# Patient Record
Sex: FEMALE | Race: WHITE | NOT HISPANIC OR LATINO | Employment: OTHER | URBAN - METROPOLITAN AREA
[De-identification: names, ages, dates, MRNs, and addresses within clinical notes are randomized per-mention and may not be internally consistent; named-entity substitution may affect disease eponyms.]

---

## 2021-07-22 ENCOUNTER — APPOINTMENT (OUTPATIENT)
Dept: CT IMAGING | Facility: HOSPITAL | Age: 31
End: 2021-07-22
Attending: STUDENT IN AN ORGANIZED HEALTH CARE EDUCATION/TRAINING PROGRAM
Payer: MEDICAID

## 2021-07-22 ENCOUNTER — HOSPITAL ENCOUNTER (INPATIENT)
Facility: HOSPITAL | Age: 31
LOS: 1 days | Discharge: HOME | End: 2021-07-23
Attending: STUDENT IN AN ORGANIZED HEALTH CARE EDUCATION/TRAINING PROGRAM | Admitting: STUDENT IN AN ORGANIZED HEALTH CARE EDUCATION/TRAINING PROGRAM
Payer: MEDICAID

## 2021-07-22 DIAGNOSIS — F10.939 ALCOHOL WITHDRAWAL SYNDROME WITH COMPLICATION (H): ICD-10-CM

## 2021-07-22 LAB
ALBUMIN SERPL-MCNC: 4.3 G/DL (ref 3.5–5)
ALBUMIN UR-MCNC: 10 MG/DL
ALP SERPL-CCNC: 79 U/L (ref 45–120)
ALT SERPL W P-5'-P-CCNC: 79 U/L (ref 0–45)
AMPHETAMINES UR QL SCN: NORMAL
ANION GAP SERPL CALCULATED.3IONS-SCNC: 15 MMOL/L (ref 5–18)
APPEARANCE UR: CLEAR
AST SERPL W P-5'-P-CCNC: 97 U/L (ref 0–40)
BACTERIA #/AREA URNS HPF: ABNORMAL /HPF
BARBITURATES UR QL: NORMAL
BENZODIAZ UR QL: NORMAL
BILIRUB DIRECT SERPL-MCNC: 0.3 MG/DL
BILIRUB SERPL-MCNC: 1.1 MG/DL (ref 0–1)
BILIRUB UR QL STRIP: NEGATIVE
BUN SERPL-MCNC: 8 MG/DL (ref 8–22)
CALCIUM SERPL-MCNC: 9 MG/DL (ref 8.5–10.5)
CANNABINOIDS UR QL SCN: NORMAL
CHLORIDE BLD-SCNC: 101 MMOL/L (ref 98–107)
CO2 SERPL-SCNC: 21 MMOL/L (ref 22–31)
COCAINE UR QL: NORMAL
COLOR UR AUTO: ABNORMAL
CREAT SERPL-MCNC: 0.63 MG/DL (ref 0.6–1.1)
CREAT UR-MCNC: 63 MG/DL
ERYTHROCYTE [DISTWIDTH] IN BLOOD BY AUTOMATED COUNT: 13.1 % (ref 10–15)
ETHANOL SERPL-MCNC: <10 MG/DL
GFR SERPL CREATININE-BSD FRML MDRD: >90 ML/MIN/1.73M2
GLUCOSE BLD-MCNC: 118 MG/DL (ref 70–125)
GLUCOSE UR STRIP-MCNC: NEGATIVE MG/DL
HCG SERPL QL: NEGATIVE
HCT VFR BLD AUTO: 38.4 % (ref 35–47)
HGB BLD-MCNC: 12.9 G/DL (ref 11.7–15.7)
HGB UR QL STRIP: ABNORMAL
HOLD SPECIMEN: NORMAL
KETONES UR STRIP-MCNC: 100 MG/DL
LEUKOCYTE ESTERASE UR QL STRIP: NEGATIVE
MAGNESIUM SERPL-MCNC: 1.5 MG/DL (ref 1.8–2.6)
MCH RBC QN AUTO: 32.6 PG (ref 26.5–33)
MCHC RBC AUTO-ENTMCNC: 33.6 G/DL (ref 31.5–36.5)
MCV RBC AUTO: 97 FL (ref 78–100)
METHADONE UR QL SCN: NORMAL
MUCOUS THREADS #/AREA URNS LPF: PRESENT /LPF
NITRATE UR QL: NEGATIVE
OPIATES UR QL SCN: NORMAL
OXYCODONE UR QL: NORMAL
PCP UR QL SCN: NORMAL
PH UR STRIP: 5.5 [PH] (ref 5–7)
PHOSPHATE SERPL-MCNC: 2 MG/DL (ref 2.5–4.5)
PLATELET # BLD AUTO: 205 10E3/UL (ref 150–450)
POTASSIUM BLD-SCNC: 3.5 MMOL/L (ref 3.5–5)
PROT SERPL-MCNC: 7.5 G/DL (ref 6–8)
RBC # BLD AUTO: 3.96 10E6/UL (ref 3.8–5.2)
RBC URINE: <1 /HPF
SARS-COV-2 RNA RESP QL NAA+PROBE: NEGATIVE
SODIUM SERPL-SCNC: 137 MMOL/L (ref 136–145)
SP GR UR STRIP: 1.01 (ref 1–1.03)
SQUAMOUS EPITHELIAL: 1 /HPF
UROBILINOGEN UR STRIP-MCNC: <2 MG/DL
WBC # BLD AUTO: 8.3 10E3/UL (ref 4–11)
WBC URINE: 1 /HPF

## 2021-07-22 PROCEDURE — 80307 DRUG TEST PRSMV CHEM ANLYZR: CPT | Performed by: MASSAGE THERAPIST

## 2021-07-22 PROCEDURE — 250N000013 HC RX MED GY IP 250 OP 250 PS 637: Performed by: FAMILY MEDICINE

## 2021-07-22 PROCEDURE — 250N000013 HC RX MED GY IP 250 OP 250 PS 637: Performed by: STUDENT IN AN ORGANIZED HEALTH CARE EDUCATION/TRAINING PROGRAM

## 2021-07-22 PROCEDURE — 250N000011 HC RX IP 250 OP 636: Performed by: STUDENT IN AN ORGANIZED HEALTH CARE EDUCATION/TRAINING PROGRAM

## 2021-07-22 PROCEDURE — 93005 ELECTROCARDIOGRAM TRACING: CPT | Performed by: STUDENT IN AN ORGANIZED HEALTH CARE EDUCATION/TRAINING PROGRAM

## 2021-07-22 PROCEDURE — 120N000001 HC R&B MED SURG/OB

## 2021-07-22 PROCEDURE — 36415 COLL VENOUS BLD VENIPUNCTURE: CPT | Performed by: STUDENT IN AN ORGANIZED HEALTH CARE EDUCATION/TRAINING PROGRAM

## 2021-07-22 PROCEDURE — 99285 EMERGENCY DEPT VISIT HI MDM: CPT | Mod: 25

## 2021-07-22 PROCEDURE — 250N000011 HC RX IP 250 OP 636: Performed by: MASSAGE THERAPIST

## 2021-07-22 PROCEDURE — C9803 HOPD COVID-19 SPEC COLLECT: HCPCS

## 2021-07-22 PROCEDURE — 82248 BILIRUBIN DIRECT: CPT | Performed by: MASSAGE THERAPIST

## 2021-07-22 PROCEDURE — 96365 THER/PROPH/DIAG IV INF INIT: CPT

## 2021-07-22 PROCEDURE — 250N000009 HC RX 250: Performed by: STUDENT IN AN ORGANIZED HEALTH CARE EDUCATION/TRAINING PROGRAM

## 2021-07-22 PROCEDURE — HZ2ZZZZ DETOXIFICATION SERVICES FOR SUBSTANCE ABUSE TREATMENT: ICD-10-PCS | Performed by: STUDENT IN AN ORGANIZED HEALTH CARE EDUCATION/TRAINING PROGRAM

## 2021-07-22 PROCEDURE — 70450 CT HEAD/BRAIN W/O DYE: CPT

## 2021-07-22 PROCEDURE — 84703 CHORIONIC GONADOTROPIN ASSAY: CPT | Performed by: STUDENT IN AN ORGANIZED HEALTH CARE EDUCATION/TRAINING PROGRAM

## 2021-07-22 PROCEDURE — 87635 SARS-COV-2 COVID-19 AMP PRB: CPT | Performed by: EMERGENCY MEDICINE

## 2021-07-22 PROCEDURE — 83735 ASSAY OF MAGNESIUM: CPT | Performed by: MASSAGE THERAPIST

## 2021-07-22 PROCEDURE — 96375 TX/PRO/DX INJ NEW DRUG ADDON: CPT

## 2021-07-22 PROCEDURE — 96366 THER/PROPH/DIAG IV INF ADDON: CPT

## 2021-07-22 PROCEDURE — 85027 COMPLETE CBC AUTOMATED: CPT | Performed by: STUDENT IN AN ORGANIZED HEALTH CARE EDUCATION/TRAINING PROGRAM

## 2021-07-22 PROCEDURE — 81001 URINALYSIS AUTO W/SCOPE: CPT | Performed by: MASSAGE THERAPIST

## 2021-07-22 PROCEDURE — 82077 ASSAY SPEC XCP UR&BREATH IA: CPT | Performed by: STUDENT IN AN ORGANIZED HEALTH CARE EDUCATION/TRAINING PROGRAM

## 2021-07-22 PROCEDURE — 96376 TX/PRO/DX INJ SAME DRUG ADON: CPT

## 2021-07-22 PROCEDURE — 84100 ASSAY OF PHOSPHORUS: CPT | Performed by: STUDENT IN AN ORGANIZED HEALTH CARE EDUCATION/TRAINING PROGRAM

## 2021-07-22 PROCEDURE — 80053 COMPREHEN METABOLIC PANEL: CPT | Performed by: STUDENT IN AN ORGANIZED HEALTH CARE EDUCATION/TRAINING PROGRAM

## 2021-07-22 PROCEDURE — 258N000003 HC RX IP 258 OP 636: Performed by: STUDENT IN AN ORGANIZED HEALTH CARE EDUCATION/TRAINING PROGRAM

## 2021-07-22 RX ORDER — GABAPENTIN 300 MG/1
300 CAPSULE ORAL EVERY 8 HOURS
Status: DISCONTINUED | OUTPATIENT
Start: 2021-07-28 | End: 2021-07-23 | Stop reason: HOSPADM

## 2021-07-22 RX ORDER — ONDANSETRON 2 MG/ML
4 INJECTION INTRAMUSCULAR; INTRAVENOUS EVERY 6 HOURS PRN
Status: DISCONTINUED | OUTPATIENT
Start: 2021-07-22 | End: 2021-07-23 | Stop reason: HOSPADM

## 2021-07-22 RX ORDER — ONDANSETRON 4 MG/1
4 TABLET, ORALLY DISINTEGRATING ORAL EVERY 6 HOURS PRN
Status: DISCONTINUED | OUTPATIENT
Start: 2021-07-22 | End: 2021-07-23 | Stop reason: HOSPADM

## 2021-07-22 RX ORDER — GABAPENTIN 100 MG/1
100 CAPSULE ORAL EVERY 8 HOURS
Status: DISCONTINUED | OUTPATIENT
Start: 2021-07-30 | End: 2021-07-23 | Stop reason: HOSPADM

## 2021-07-22 RX ORDER — CLONIDINE HYDROCHLORIDE 0.1 MG/1
0.1 TABLET ORAL EVERY 8 HOURS
Status: DISCONTINUED | OUTPATIENT
Start: 2021-07-22 | End: 2021-07-23 | Stop reason: HOSPADM

## 2021-07-22 RX ORDER — LIDOCAINE 40 MG/G
CREAM TOPICAL
Status: DISCONTINUED | OUTPATIENT
Start: 2021-07-22 | End: 2021-07-23 | Stop reason: HOSPADM

## 2021-07-22 RX ORDER — GABAPENTIN 600 MG/1
1200 TABLET ORAL ONCE
Status: DISCONTINUED | OUTPATIENT
Start: 2021-07-22 | End: 2021-07-22

## 2021-07-22 RX ORDER — LORAZEPAM 0.5 MG/1
1-2 TABLET ORAL EVERY 30 MIN PRN
Status: DISCONTINUED | OUTPATIENT
Start: 2021-07-22 | End: 2021-07-23 | Stop reason: HOSPADM

## 2021-07-22 RX ORDER — GABAPENTIN 400 MG/1
1200 CAPSULE ORAL ONCE
Status: COMPLETED | OUTPATIENT
Start: 2021-07-22 | End: 2021-07-22

## 2021-07-22 RX ORDER — FLUMAZENIL 0.1 MG/ML
0.2 INJECTION, SOLUTION INTRAVENOUS
Status: DISCONTINUED | OUTPATIENT
Start: 2021-07-22 | End: 2021-07-23 | Stop reason: HOSPADM

## 2021-07-22 RX ORDER — GABAPENTIN 300 MG/1
900 CAPSULE ORAL EVERY 8 HOURS
Status: DISCONTINUED | OUTPATIENT
Start: 2021-07-23 | End: 2021-07-23 | Stop reason: HOSPADM

## 2021-07-22 RX ORDER — MAGNESIUM SULFATE HEPTAHYDRATE 40 MG/ML
2 INJECTION, SOLUTION INTRAVENOUS ONCE
Status: COMPLETED | OUTPATIENT
Start: 2021-07-22 | End: 2021-07-22

## 2021-07-22 RX ORDER — MULTIPLE VITAMINS W/ MINERALS TAB 9MG-400MCG
1 TAB ORAL DAILY
Status: DISCONTINUED | OUTPATIENT
Start: 2021-07-23 | End: 2021-07-23 | Stop reason: HOSPADM

## 2021-07-22 RX ORDER — GABAPENTIN 300 MG/1
600 CAPSULE ORAL EVERY 8 HOURS
Status: DISCONTINUED | OUTPATIENT
Start: 2021-07-26 | End: 2021-07-23 | Stop reason: HOSPADM

## 2021-07-22 RX ORDER — LORAZEPAM 2 MG/ML
1 INJECTION INTRAMUSCULAR ONCE
Status: COMPLETED | OUTPATIENT
Start: 2021-07-22 | End: 2021-07-22

## 2021-07-22 RX ORDER — OLANZAPINE 5 MG/1
5-10 TABLET, ORALLY DISINTEGRATING ORAL EVERY 6 HOURS PRN
Status: DISCONTINUED | OUTPATIENT
Start: 2021-07-22 | End: 2021-07-23 | Stop reason: HOSPADM

## 2021-07-22 RX ORDER — LORAZEPAM 2 MG/ML
1-2 INJECTION INTRAMUSCULAR EVERY 30 MIN PRN
Status: DISCONTINUED | OUTPATIENT
Start: 2021-07-22 | End: 2021-07-23 | Stop reason: HOSPADM

## 2021-07-22 RX ORDER — HALOPERIDOL 5 MG/ML
2.5-5 INJECTION INTRAMUSCULAR EVERY 6 HOURS PRN
Status: DISCONTINUED | OUTPATIENT
Start: 2021-07-22 | End: 2021-07-23 | Stop reason: HOSPADM

## 2021-07-22 RX ORDER — FOLIC ACID 1 MG/1
1 TABLET ORAL DAILY
Status: DISCONTINUED | OUTPATIENT
Start: 2021-07-23 | End: 2021-07-23 | Stop reason: HOSPADM

## 2021-07-22 RX ADMIN — LORAZEPAM 1 MG: 2 INJECTION INTRAMUSCULAR; INTRAVENOUS at 16:48

## 2021-07-22 RX ADMIN — GABAPENTIN 1200 MG: 400 CAPSULE ORAL at 21:12

## 2021-07-22 RX ADMIN — CLONIDINE HYDROCHLORIDE 0.1 MG: 0.1 TABLET ORAL at 19:26

## 2021-07-22 RX ADMIN — FOLIC ACID: 5 INJECTION, SOLUTION INTRAMUSCULAR; INTRAVENOUS; SUBCUTANEOUS at 21:13

## 2021-07-22 RX ADMIN — LORAZEPAM 1 MG: 2 INJECTION INTRAMUSCULAR; INTRAVENOUS at 14:39

## 2021-07-22 RX ADMIN — MAGNESIUM SULFATE HEPTAHYDRATE 2 G: 40 INJECTION, SOLUTION INTRAVENOUS at 15:25

## 2021-07-22 ASSESSMENT — ACTIVITIES OF DAILY LIVING (ADL)
DIFFICULTY_COMMUNICATING: NO
CONCENTRATING,_REMEMBERING_OR_MAKING_DECISIONS_DIFFICULTY: NO
DIFFICULTY_EATING/SWALLOWING: NO
FALL_HISTORY_WITHIN_LAST_SIX_MONTHS: YES
HEARING_DIFFICULTY_OR_DEAF: NO
DOING_ERRANDS_INDEPENDENTLY_DIFFICULTY: NO
NUMBER_OF_TIMES_PATIENT_HAS_FALLEN_WITHIN_LAST_SIX_MONTHS: 1
DRESSING/BATHING_DIFFICULTY: NO
WHICH_OF_THE_ABOVE_FUNCTIONAL_RISKS_HAD_A_RECENT_ONSET_OR_CHANGE?: FALL HISTORY
VISION_MANAGEMENT: CONTACTS
WEAR_GLASSES_OR_BLIND: YES
WALKING_OR_CLIMBING_STAIRS_DIFFICULTY: NO
DEPENDENT_IADLS:: INDEPENDENT
TOILETING_ISSUES: NO

## 2021-07-22 ASSESSMENT — MIFFLIN-ST. JEOR: SCORE: 1446.14

## 2021-07-22 NOTE — ED NOTES
Bed: JNED-22  Expected date: 7/22/21  Expected time:   Means of arrival: Ambulance  Comments:  31 F  Andria  seizure

## 2021-07-22 NOTE — H&P
[facility]    History and Physical - [facility]        Date of Admission:  7/22/2021    Assessment & Plan      Susan Hope is a 31 year old female admitted on 7/22/2021. She has a history of alcohol use disorder and is admitted for management of alcohol withdrawal and seizure.    Remains admitted while managing alcohol withdrawal.      #Alcohol withdrawal  #Withdrawal seizure  #Head trauma  Patient with extensive history of alcohol abuse presents for withdrawal symptoms and what sounds like a withdrawal seizure.   Patient did bite her tongue but there was no incontinence.  U tox was otherwise negative and patient denies any other illicit substance use.  Last drink was sometime this morning.  She does have a history of significant tremors with withdrawals but has never had a seizure.  Lab work on arrival showed mildly elevated ALT/AST and bilirubin.  Patient is a little acidotic/dehydrated with low CO2 and ketones in her urine.  Patient did well after some ativan in the ED.  CT of the head did not show any acute injury at this time.  Patient has essentially returned to her baseline at this time.  She is unsure if she would like a rule 25 consult at this time or not, but will think about it.  She understands the plan to admit for CIWA support at this time and was informed it can sometimes take up to 72 hours before getting through withdrawal, it will depend on how her body handles it.    -CIWA protocol  -IV thiamine and folate, will transition to oral per protocol  -Gabapentin per protocol  -Clonidine per protocol  -Ativan as needed for withdrawal symptoms  -INR check in AM with   -Repeat labs in AM  -Patient will decide on rule 25 referral  -Could consider neurology referral, however history consistent with withdrawal seizures     #Hypomagnesemia  Patients magnesium was low on admission  -Start magnesium replacement protocol.       Diet: Combination Diet Regular Diet Adult     DVT Prophylaxis: Pneumatic Compression Devices  Wilhelm Catheter: Not present  Fluids: N/A  Central Lines: None  Code Status: Full Code      Risk Factors Present on Admission           # Hypomagnesemia: Mg = 1.5 mg/dL (Ref range: 1.8 - 2.6 mg/dL) on admission, will replace as needed         Disposition Plan   Expected discharge: Unclear at this time, recommended to prior living arrangement once safe disposition plan/ TCU bed available.     The patient's care was discussed with the faculty at morning report.    Tonio Pascual MD  [facility]  Securely message with the Vocera Web Console (learn more here)  Text page via [facility] Paging/Directory    ______________________________________________________________________    Chief Complaint   Seizure, possibly due to alcohol withdrawl    History of Present Illness   Susan Hope is a 31 year old female admitted on 7/22/2021. She has a history of alcohol use disorder and is admitted for management of alcohol withdrawal and seizure.  Patient has a history of alcohol abuse and had been drinking recently.  While she has never been to inpatient or outpatient treatment, she had been able to wean herself off of alcohol and stay sober several months at a time a couple of times in the past.  Unfortunately with the pandemic she returned to drinking.  While she had worked up to several beverages made with hard liquor everyday, she had recently been weaning down and is currently on one bottle of wine a day.  When she has tried to stop drinking recently she would get terrible chills and tremors and return to drinking.  Her last drink of alcohol was this morning.  This afternoon the patient became lightheaded walking up stairs.  She proceeded to fall and had several minutes of full body shaking per her partner who witnessed the event.  EMS were contacted and arrived on scene to note a postictal period.  While the patient did not  report any incontinence of bolwel or bladder she did bite her tongue.  She has since returned to baseline.  Currently she is feeling stable and denies any acute questions or concerns.      Review of Systems    The 10 point Review of Systems is negative other than noted in the HPI or here.     Past Medical History    Alcohol abuse     Past Surgical History   Patient denies any acute surgical history    Social History   Lives with partner.  Self employed as a .  Drinks alcohol.  Denies any illicit substance use.      Family History   No significant family history,    Prior to Admission Medications   Prior to Admission Medications   Prescriptions Last Dose Informant Patient Reported? Taking?   sertraline (ZOLOFT) 50 MG tablet 7/22/2021 at am  Yes Yes   Sig: Take 75 mg by mouth daily      Facility-Administered Medications: None     Allergies   No Known Allergies    Physical Exam   Vital Signs: Temp: 98.6  F (37  C) Temp src: Axillary BP: 124/74 Pulse: 115   Resp: 20 SpO2: 95 % O2 Device: None (Room air)    Weight: 159 lbs 11.2 oz    Constitutional: awake, alert, cooperative, no apparent distress, and appears stated age, sitting up and watching television  Eyes: Lids and lashes normal, pupils ~4 cm dilated equal, round and reactive to light, extra ocular muscles intact, sclera clear, conjunctiva normal  ENT: Normocephalic, patient does have abrasion noted to her right sided temporal region, not a laceration requiring sutures.  Sinuses nontender on palpation, external ears without lesions, oral pharynx with moist mucous membranes, patient does have ~3 cm abrasion noted to her lateral right tongue, does not appear deep enough to suture, no obvious infection or source of bleeding currently, tonsils without erythema or exudates, gums normal and good dentition.  Hematologic / Lymphatic: no cervical lymphadenopathy  Respiratory: No increased work of breathing, good air exchange, clear to auscultation bilaterally,  no crackles or wheezing  Cardiovascular: Normal apical impulse, regular rate and rhythm, normal S1 and S2, no S3 or S4, and no murmur noted  GI: No scars, normal bowel sounds, soft, non-distended, non-tender, no masses palpated, no hepatosplenomegally  Skin: Other than area on head, no significant rashes or abrasions noted  Musculoskeletal: There is no redness, warmth, or swelling of the joints.  Full range of motion noted.  Motor strength is 5 out of 5 all extremities bilaterally.  Tone is normal.  Neurologic: Awake, alert, oriented to name, place and time.  Cranial nerves II-XII are grossly intact.  Motor is 5 out of 5 bilaterally.  Cerebellar finger to nose, heel to shin intact.  Sensory is intact.  Babinski down going, Romberg negative, and gait is normal.  Neuropsychiatric: General: normal, calm and normal eye contact  Affect: anxious    Data   Data reviewed today: I reviewed all medications, new labs and imaging results over the last 24 hours.   Recent Labs   Lab 07/22/21  1424   WBC 8.3   HGB 12.9   MCV 97         POTASSIUM 3.5   CHLORIDE 101   CO2 21*   BUN 8   CR 0.63   ANIONGAP 15   LYNNETTE 9.0      ALBUMIN 4.3   PROTTOTAL 7.5   BILITOTAL 1.1*   ALKPHOS 79   ALT 79*   AST 97*

## 2021-07-22 NOTE — ED TRIAGE NOTES
Pt arrives via EMS for c/o having a witnessed seizure.  Per report, pt was with her  walking up some stairs when  She started feeling lightheaded.  Pt's spouse assisted patient to the bed and had a full body seizure.  Pt has no known history of seizures.  Pt admits she drinks about a bottle of wine daily.  Pt is tremulous and is anxious appearing.

## 2021-07-22 NOTE — ED PROVIDER NOTES
Emergency Department Encounter     Evaluation Date & Time:   7/22/2021  2:07 PM    CHIEF COMPLAINT:  Seizures and Alcohol Problem      Triage Note:No notes on file      Impression and Plan     ED COURSE & MEDICAL DECISION MAKING:    Susan Hope is a 31 year old female with history of alcohol use disorder here after a seizure.  This does appear to be a true seizure with witnessed, diffuse shaking, bite on lateral side of tongue and postictal period observed by EMS.     This is her first seizure and so a wide differential was considered including:   Metabolic derangement- except for mildly low magnesium, electrolytes WNL   Intracranial process- CT negative, she should follow up with neurology for outpatient MRI  Do not suspect infectious etiology as she has been afebrile, no white count, no infectious symptoms     Think this is most likely alcohol withdrawal seizure, although she reports last drink was less than 24 hours ago, she does report she has been trying to cut down and recently switched from hard alcohol to wine. She has previously had tremors when trying to stop drinking, but reports no previous seizure.      At the conclusion of the encounter I discussed the results of all the tests and the disposition. The questions were answered. The patient or family acknowledged understanding and was agreeable with the care plan.    FINAL IMPRESSION:    ICD-10-CM    1. Alcohol withdrawal syndrome with complication (H)  F10.239          Reassessments & Consults       MEDICATIONS GIVEN IN THE EMERGENCY DEPARTMENT:  Medications   LORazepam (ATIVAN) injection 1 mg (has no administration in time range)   LORazepam (ATIVAN) injection 1 mg (1 mg Intravenous Given 7/22/21 1439)   magnesium sulfate 2 g in water intermittent infusion (2 g Intravenous New Bag 7/22/21 1525)       NEW PRESCRIPTIONS STARTED AT TODAY'S ED VISIT:  New Prescriptions    No medications on file       HPI     HPI     Susan Hope is a 31 year  old female with a pertinent history of alcohol use disorder who presents to this ED for evaluation of seizure. Per EMS: patient got light headed while walking up stairs today. She fell and have several minutes of full body shaking, witnessed by her partner. No reported urinary incontinence but was postictal when medics arrived.     On arrival, patient does not remember the incident and reports no fevers/chills, headache, vision changes, shortness of breath, chest pain or any other pain.  She does have some nausea but no vomiting. She reports that she has been drinking about about 1 bottle of wine per day, she has silvio trying to wean herself off of hard alcohol, last drink 7/21 evening.          REVIEW OF SYSTEMS:  Review of Systems  Non contributory except for as noted in HPi      Medical History     No past medical history on file.    No past surgical history on file.    No family history on file.    Social History     Tobacco Use     Smoking status: Not on file   Substance Use Topics     Alcohol use: Not on file     Drug use: Not on file       sertraline (ZOLOFT) 50 MG tablet        Physical Exam     First Vitals:  Patient Vitals for the past 24 hrs:   BP Temp Temp src Pulse Resp SpO2 Weight   07/22/21 1411 130/83 97.9  F (36.6  C) Oral 102 22 95 % 65.8 kg (145 lb)       PHYSICAL EXAM:   Physical Exam  EXAM  General: Alert, anxious appearing   HEENT: dry oral mucus membranes, abrasion to right side of tongue   Pulm: CTA BL, no tachypnea, speaking in full sentences  CV:Tachycardic, regular, no murmur  Abd: soft, NTND, no masses  Ext: Warm, well perfused, no LE edema  Neuro: tremulous, sensation and strength intact in all 4 extremities, CN II-XII intact, finger-nose-finger normal   Skin: No rash on limited skin exam. 6cm laceration over right eyebrow      Results     LAB:  All pertinent labs reviewed and interpreted  Labs Ordered and Resulted from Time of ED Arrival Up to the Time of Departure from the ED   BASIC  METABOLIC PANEL - Abnormal; Notable for the following components:       Result Value    Carbon Dioxide (CO2) 21 (*)     All other components within normal limits   HEPATIC FUNCTION PANEL - Abnormal; Notable for the following components:    Bilirubin Total 1.1 (*)     AST 97 (*)     ALT 79 (*)     All other components within normal limits   MAGNESIUM - Abnormal; Notable for the following components:    Magnesium 1.5 (*)     All other components within normal limits   CBC WITH PLATELETS - Normal   HCG QUALITATIVE PREGNANCY - Normal   ETHYL ALCOHOL LEVEL - Normal   EXTRA GREEN TOP (LITHIUM HEPARIN) TUBE   ROUTINE UA WITH MICROSCOPIC REFLEX TO CULTURE   PERIPHERAL IV CATHETER   CALL   COVID-19 VIRUS (CORONAVIRUS) BY PCR   EXTRA TUBE    Narrative:     The following orders were created for panel order [place] Draw.  Procedure                               Abnormality         Status                     ---------                               -----------         ------                     Extra Green Top (Lithium...[[id number]]                      Final result                 Please view results for these tests on the individual orders.   URINE DRUGS OF ABUSE SCREEN       RADIOLOGY:  Head CT w/o contrast   Final Result   IMPRESSION:   1.  No intracranial hemorrhage, mass lesions, hydrocephalus or CT evidence for an acute infarct.                 ECG:  Performed at: 1525    Impression: Normal sinus    Rate: 99  Rhythm: sinus  Axis: normal  NM Interval: 128  QRS Interval: 82  QTc Interval: 451  ST Changes: none  Comparison: no previous    I have independently reviewed and interpreted the EKS(s) documented above        Eliezer Brown MD  Emergency Medicine  [facility] EMERGENCY DEPARTMENT       Eliezer Brown MD  Resident  07/22/21 4563       Eliezer Brown MD  Resident  07/22/21 6130

## 2021-07-22 NOTE — PHARMACY-ADMISSION MEDICATION HISTORY
Pharmacy Note - Admission Medication History    Pertinent Provider Information:      ______________________________________________________________________    Prior To Admission (PTA) med list completed and updated in EMR.       Prior to Admission Medications   Prescriptions Last Dose Informant Patient Reported? Taking?   sertraline (ZOLOFT) 50 MG tablet 7/22/2021 at am  Yes Yes   Sig: Take 75 mg by mouth daily      Facility-Administered Medications: None       Information source(s): Patient  Method of interview communication: in-person    Summary of Changes to PTA Med List  New: sertraline  Discontinued: none  Changed: none    Patient was asked about OTC/herbal products specifically.  PTA med list reflects this.    In the past week, patient estimated taking medication this percent of the time:  greater than 90%.    Allergies were reviewed, assessed, and updated with the patient.      Patient does not use any multi-dose medications prior to admission.    The information provided in this note is only as accurate as the sources available at the time of the update(s).    Thank you for the opportunity to participate in the care of this patient.    Nubia Reid RPH  7/22/2021 4:05 PM

## 2021-07-22 NOTE — ED PROVIDER NOTES
ED SIGNOUT  Date/Time:7/22/2021 3:22 PM    Patient signed out to me by my colleague, Dr.Mary Barbosa.  Please see their note for complete history and physical. Plan to follow up on CT head and hospitalization.    The creation of this record is based on the scribe s observations of the work being performed by Dr.Daniel Sherman and the provider s statements to them. It was created on their behalf by Gali xiong trained medical scribe. This document has been checked and approved by the attending provider.      REMAINING ED WORKUP:  CT head, hospitalization    Vitals:  /82   Pulse 111   Temp 97.9  F (36.6  C) (Oral)   Resp 27   Wt 65.8 kg (145 lb)   LMP 06/22/2021 (Approximate)   SpO2 98%       Pertinent labs results reviewed   Results for orders placed or performed during the hospital encounter of 07/22/21   Head CT w/o contrast    Impression    IMPRESSION:  1.  No intracranial hemorrhage, mass lesions, hydrocephalus or CT evidence for an acute infarct.   CBC (+ platelets, no diff)   Result Value Ref Range    WBC Count 8.3 4.0 - 11.0 10e3/uL    RBC Count 3.96 3.80 - 5.20 10e6/uL    Hemoglobin 12.9 11.7 - 15.7 g/dL    Hematocrit 38.4 35.0 - 47.0 %    MCV 97 78 - 100 fL    MCH 32.6 26.5 - 33.0 pg    MCHC 33.6 31.5 - 36.5 g/dL    RDW 13.1 10.0 - 15.0 %    Platelet Count 205 150 - 450 10e3/uL   Basic metabolic panel   Result Value Ref Range    Sodium 137 136 - 145 mmol/L    Potassium 3.5 3.5 - 5.0 mmol/L    Chloride 101 98 - 107 mmol/L    Carbon Dioxide (CO2) 21 (L) 22 - 31 mmol/L    Anion Gap 15 5 - 18 mmol/L    Urea Nitrogen 8 8 - 22 mg/dL    Creatinine 0.63 0.60 - 1.10 mg/dL    Calcium 9.0 8.5 - 10.5 mg/dL    Glucose 118 70 - 125 mg/dL    GFR Estimate >90 >60 mL/min/1.73m2   HCG QUALitative pregnancy (blood)   Result Value Ref Range    hCG Serum Qualitative Negative Negative   Alcohol level blood   Result Value Ref Range    Alcohol, Blood <10 None detected mg/dL   Hepatic function panel   Result Value  Ref Range    Bilirubin Total 1.1 (H) 0.0 - 1.0 mg/dL    Bilirubin Direct 0.3 <=0.5 mg/dL    Protein Total 7.5 6.0 - 8.0 g/dL    Albumin 4.3 3.5 - 5.0 g/dL    Alkaline Phosphatase 79 45 - 120 U/L    AST 97 (H) 0 - 40 U/L    ALT 79 (H) 0 - 45 U/L   Extra Green Top (Lithium Heparin) Tube   Result Value Ref Range    Hold Specimen JIC    Result Value Ref Range    Magnesium 1.5 (L) 1.8 - 2.6 mg/dL       Pertinent imaging reviewed   Please see official radiology report.  Head CT w/o contrast   Final Result   IMPRESSION:   1.  No intracranial hemorrhage, mass lesions, hydrocephalus or CT evidence for an acute infarct.           Interventions  Medications   LORazepam (ATIVAN) injection 1 mg (1 mg Intravenous Given 7/22/21 1439)   magnesium sulfate 2 g in water intermittent infusion (2 g Intravenous New Bag 7/22/21 1525)   LORazepam (ATIVAN) injection 1 mg (1 mg Intravenous Given 7/22/21 1648)        ED Course/MDM:  3:23 PM Signout accepted from Virginie Barbosa.  Prior records were reviewed.  Diagnostics from this visit are reviewed.  Sign out from Dr. Barbosa pending CT head, anticipate hospitalization for first time withdrawal seizure.  Hx of etoh abuse, witnessed seizure.  Mild hypomag and tachy here, received ativan for withdrawal symptoms, mag IV repletion. Resident Dr. Brown providing care for pt in conjunction as well.  5:30 PM Pt hospitalized for suspected first time withdrawal seizure with long etoh abuse history.  Pt stable here.  Admitted to [facility] residency service.  Dr. Brown spoke with accepting physician.  Pt aware of results and plan, agreeable.           1. Alcohol withdrawal syndrome with complication (H)          Phil Sherman,   [facility] Emergency Department        Phil Sherman MD  07/22/21 [id number]

## 2021-07-22 NOTE — ED PROVIDER NOTES
At this point I agree with the current assessment done in the Emergency Department.   I, Virginie Barbosa MD have reviewed the documentation, personally taken the patient's history, performed an exam and agree with the physical finds, diagnosis and management plan.     Patient is seen with Eliezer Brown MD. Susan Hope is a 31 year old female, with no known pertinent history, who presents to the Emergency Department for the evaluation of a seizure.     Per EMS, patient was walking up the stairs in her mother's home when she had a sudden onset of lightheadedness/feeling unwell. The patient's  states that the patient fell and had a seizure, with several minutes of full body shaking that resolved on her own. Patient did not hit her head. Denies any history of seizures. Of note, her  reports the patient drinks heavily but has not had any alcohol yet today. She drinks about one bottle of wine per day. EMS reports the patient is tachycardic, 120s, and hypertensive, 150s. Her blood sugar was 148. EMS is unaware of any urinary incontinence. Patient is on Zoloft for depression. She did bite her tongue. She reports no recent illness or infectious symptoms. Her last drink was last night, but has been trying to wean down on alcohol on her own.    ROS: see resident note and hpi    Social: drinks about 1.5 bottles of wine daily    Physical Exam:   Constitutional: Well developed, well nourished. No acute distress  HENT: Normocephalic, small superficial cut on the left temple, mucous membranes moist. Neck- gross ROM intact.   Eyes: Slightly dilated pupils but equal and reactive. EOMI  Respiratory: Clear to auscultation bilaterally, no respiratory distress, no wheezing, speaks full sentences easily.  Cardiovascular: Normal heart rate, regular rhythm. No lower extremity edema.  GI: Soft, no tenderness to deep palpation in all quadrants, no masses.  Musculoskeletal: Moving all 4 extremities intentionally and  "without pain. No obvious deformity.  Skin: Warm, dry, no rash. Mild diaphoresis.   Neuro: Alert and oriented, CN II-XII grossly intact, speech clear. 5/5 strength in upper and lower extremities. Sensation to light touch intact in all 4 extremities. No pronator drift. No dysmetria with finger to nose. Tremulous.   Psychiatric: Affect normal, cooperative.    EKG:    Rate: 99  Rhythm: sinus  Axis: normal  Intervals: , QRS 82, QTc 451  Ischemic changes: no concerning ST changes  Comparison: none    I have independently reviewed and interpreted the EKG(s) documented above.      MDM:   32 y/o F who presents with concern for seizure. Vitals notable for mild tachycardia on arrival. She was walking up stairs and felt \"unwell\" and had witnessed shaking episode witnessed by . Was postictal and did bite her tongue so due suspect true seizure. Dx includes cardiac cause, mass, first time seizure, ICH, substance withdrawal among others. EKG sinus and reassuring. Labs notable for mild LFT elevation (suspect from alcohol use). Mag low, replacement ordered. Given possible head trauma plan for CT head without. No neuro deficits here. No fevers/nuchal rigidity, do not suspect meningitis/encephalitis. I do have concern that this could possibly be from alcohol withdrawal, is tremulous on exam and mildly diaphoretic and has been trying to cut back on alcohol though did drink last night. She will be signed out to Dr. Sherman pending scan and reassessment. Anticipate either discharge to detox or admission. Patient is in agreement with plan.     Virginie Barbosa MD  07/22/21 1527       Virginie Barbosa MD  07/22/21 1530    "

## 2021-07-22 NOTE — ED NOTES
[facility] ED Handoff Report    ED Chief Complaint:  chief complaint    ED Diagnosis:  (F10.239) Alcohol withdrawal syndrome with complication (H)         PMH:  No past medical history on file.     Code Status:  No Order     Falls Risk: Yes    Current Living Situation/Residence: Independent; private residence.    Elimination Status:  Continent.    Activity Level:  Stand by assist.    Patients Preferred Language:  Data Unavailable     Needed: No    Infection:  [unfilled]     Vital Signs:  /82   Pulse 111   Temp 97.9  F (36.6  C) (Oral)   Resp 22   Wt 65.8 kg (145 lb)   LMP 06/22/2021 (Approximate)   SpO2 98%      Pain Score:  0/10    Is the Patient Confused:  No    Last Food or Drink: Today    Focused Assessment:  See charting.    Tests Performed:  Lab; imaging.    Abnormal Results:    Abnormal Labs Reviewed   BASIC METABOLIC PANEL - Abnormal; Notable for the following components:       Result Value    Carbon Dioxide (CO2) 21 (*)     All other components within normal limits   HEPATIC FUNCTION PANEL - Abnormal; Notable for the following components:    Bilirubin Total 1.1 (*)     AST 97 (*)     ALT 79 (*)     All other components within normal limits   MAGNESIUM - Abnormal; Notable for the following components:    Magnesium 1.5 (*)     All other components within normal limits       Head CT w/o contrast   Final Result   IMPRESSION:   1.  No intracranial hemorrhage, mass lesions, hydrocephalus or CT evidence for an acute infarct.           Treatments Provided:  See charting.    Family Dynamics/Concerns: No    Family Updated On Visitor Policy: Yes    Plan of Care Communicated to Family: Yes    Who Was Updated about Plan of Care: Pt's sig. other.    Belongings Checklist Done and Signed by Patient: Yes    Asymptomatic.      @ME2@ 7/22/2021 4:40 PM

## 2021-07-23 VITALS
DIASTOLIC BLOOD PRESSURE: 79 MMHG | OXYGEN SATURATION: 98 % | HEART RATE: 86 BPM | TEMPERATURE: 98.8 F | RESPIRATION RATE: 18 BRPM | SYSTOLIC BLOOD PRESSURE: 116 MMHG | HEIGHT: 66 IN | WEIGHT: 159.7 LBS | BODY MASS INDEX: 25.67 KG/M2

## 2021-07-23 LAB
ALBUMIN SERPL-MCNC: 3.7 G/DL (ref 3.5–5)
ALP SERPL-CCNC: 72 U/L (ref 45–120)
ALT SERPL W P-5'-P-CCNC: 59 U/L (ref 0–45)
ANION GAP SERPL CALCULATED.3IONS-SCNC: 8 MMOL/L (ref 5–18)
AST SERPL W P-5'-P-CCNC: 66 U/L (ref 0–40)
ATRIAL RATE - MUSE: 99 BPM
BILIRUB SERPL-MCNC: 1 MG/DL (ref 0–1)
BUN SERPL-MCNC: 6 MG/DL (ref 8–22)
CALCIUM SERPL-MCNC: 8.4 MG/DL (ref 8.5–10.5)
CHLORIDE BLD-SCNC: 105 MMOL/L (ref 98–107)
CO2 SERPL-SCNC: 24 MMOL/L (ref 22–31)
CREAT SERPL-MCNC: 0.58 MG/DL (ref 0.6–1.1)
DIASTOLIC BLOOD PRESSURE - MUSE: NORMAL MMHG
ERYTHROCYTE [DISTWIDTH] IN BLOOD BY AUTOMATED COUNT: 13.2 % (ref 10–15)
GFR SERPL CREATININE-BSD FRML MDRD: >90 ML/MIN/1.73M2
GLUCOSE BLD-MCNC: 77 MG/DL (ref 70–125)
HCT VFR BLD AUTO: 36.6 % (ref 35–47)
HGB BLD-MCNC: 12 G/DL (ref 11.7–15.7)
INR PPP: 1.03 (ref 0.9–1.15)
INTERPRETATION ECG - MUSE: NORMAL
MAGNESIUM SERPL-MCNC: 2.4 MG/DL (ref 1.8–2.6)
MCH RBC QN AUTO: 32.6 PG (ref 26.5–33)
MCHC RBC AUTO-ENTMCNC: 32.8 G/DL (ref 31.5–36.5)
MCV RBC AUTO: 100 FL (ref 78–100)
P AXIS - MUSE: 5 DEGREES
PLATELET # BLD AUTO: 193 10E3/UL (ref 150–450)
POTASSIUM BLD-SCNC: 3.7 MMOL/L (ref 3.5–5)
PR INTERVAL - MUSE: 128 MS
PROT SERPL-MCNC: 6.7 G/DL (ref 6–8)
QRS DURATION - MUSE: 82 MS
QT - MUSE: 352 MS
QTC - MUSE: 451 MS
R AXIS - MUSE: -7 DEGREES
RBC # BLD AUTO: 3.68 10E6/UL (ref 3.8–5.2)
SODIUM SERPL-SCNC: 137 MMOL/L (ref 136–145)
SYSTOLIC BLOOD PRESSURE - MUSE: NORMAL MMHG
T AXIS - MUSE: 8 DEGREES
VENTRICULAR RATE- MUSE: 99 BPM
WBC # BLD AUTO: 8.1 10E3/UL (ref 4–11)

## 2021-07-23 PROCEDURE — 36415 COLL VENOUS BLD VENIPUNCTURE: CPT | Performed by: STUDENT IN AN ORGANIZED HEALTH CARE EDUCATION/TRAINING PROGRAM

## 2021-07-23 PROCEDURE — 250N000013 HC RX MED GY IP 250 OP 250 PS 637: Performed by: STUDENT IN AN ORGANIZED HEALTH CARE EDUCATION/TRAINING PROGRAM

## 2021-07-23 PROCEDURE — 85610 PROTHROMBIN TIME: CPT | Performed by: STUDENT IN AN ORGANIZED HEALTH CARE EDUCATION/TRAINING PROGRAM

## 2021-07-23 PROCEDURE — 85027 COMPLETE CBC AUTOMATED: CPT | Performed by: STUDENT IN AN ORGANIZED HEALTH CARE EDUCATION/TRAINING PROGRAM

## 2021-07-23 PROCEDURE — 99223 1ST HOSP IP/OBS HIGH 75: CPT | Mod: AI | Performed by: STUDENT IN AN ORGANIZED HEALTH CARE EDUCATION/TRAINING PROGRAM

## 2021-07-23 PROCEDURE — 36415 COLL VENOUS BLD VENIPUNCTURE: CPT | Performed by: INTERNAL MEDICINE

## 2021-07-23 PROCEDURE — 83735 ASSAY OF MAGNESIUM: CPT | Performed by: INTERNAL MEDICINE

## 2021-07-23 PROCEDURE — 80053 COMPREHEN METABOLIC PANEL: CPT | Performed by: STUDENT IN AN ORGANIZED HEALTH CARE EDUCATION/TRAINING PROGRAM

## 2021-07-23 RX ORDER — LANOLIN ALCOHOL/MO/W.PET/CERES
100 CREAM (GRAM) TOPICAL DAILY
COMMUNITY
Start: 2021-07-24

## 2021-07-23 RX ORDER — GABAPENTIN 100 MG/1
100 CAPSULE ORAL EVERY 8 HOURS
Qty: 9 CAPSULE | Refills: 0 | Status: SHIPPED | OUTPATIENT
Start: 2021-07-30 | End: 2021-08-02

## 2021-07-23 RX ORDER — MULTIPLE VITAMINS W/ MINERALS TAB 9MG-400MCG
1 TAB ORAL DAILY
COMMUNITY
Start: 2021-07-24

## 2021-07-23 RX ORDER — GABAPENTIN 300 MG/1
600 CAPSULE ORAL EVERY 8 HOURS
Qty: 12 CAPSULE | Refills: 0 | Status: SHIPPED | OUTPATIENT
Start: 2021-07-26 | End: 2021-07-28

## 2021-07-23 RX ORDER — GABAPENTIN 300 MG/1
900 CAPSULE ORAL EVERY 8 HOURS
Qty: 27 CAPSULE | Refills: 0 | Status: SHIPPED | OUTPATIENT
Start: 2021-07-23 | End: 2021-07-26

## 2021-07-23 RX ORDER — GABAPENTIN 300 MG/1
300 CAPSULE ORAL EVERY 8 HOURS
Qty: 6 CAPSULE | Refills: 0 | Status: SHIPPED | OUTPATIENT
Start: 2021-07-28 | End: 2021-07-30

## 2021-07-23 RX ORDER — FOLIC ACID 1 MG/1
1 TABLET ORAL DAILY
COMMUNITY
Start: 2021-07-24

## 2021-07-23 RX ADMIN — GABAPENTIN 900 MG: 300 CAPSULE ORAL at 10:07

## 2021-07-23 RX ADMIN — CLONIDINE HYDROCHLORIDE 0.1 MG: 0.1 TABLET ORAL at 10:07

## 2021-07-23 RX ADMIN — SERTRALINE HYDROCHLORIDE 75 MG: 50 TABLET ORAL at 08:01

## 2021-07-23 RX ADMIN — GABAPENTIN 900 MG: 300 CAPSULE ORAL at 02:34

## 2021-07-23 RX ADMIN — CLONIDINE HYDROCHLORIDE 0.1 MG: 0.1 TABLET ORAL at 02:33

## 2021-07-23 RX ADMIN — Medication 1 TABLET: at 08:01

## 2021-07-23 RX ADMIN — FOLIC ACID 1 MG: 1 TABLET ORAL at 08:00

## 2021-07-23 RX ADMIN — Medication 100 MG: at 08:00

## 2021-07-23 NOTE — PLAN OF CARE
Care Management Discharge Note    Discharge Date: 07/23/2021       Discharge Disposition: Home    Discharge Services:  home self care    Discharge DME: None    Discharge Transportation: family or friend will provide    Private pay costs discussed: Not applicable    PAS Confirmation Code:    Patient/family educated on Medicare website which has current facility and service quality ratings: no    Education Provided on the Discharge Plan:    Persons Notified of Discharge Plans: patient, bedside RN, Charge RN, MD  Patient/Family in Agreement with the Plan: yes    Handoff Referral Completed: No    Additional Information:  Chart reviewed and updates with care team done.  Patient discharging home today.  Patient seen by [facility] regarding consult for chemical dependency, alcohol.  Patient denied need for any referrals to be sent.  Patient is independent with all daily activities.  Denies need for CM services.  Family transport home at discharge.      Yessy Ledezma RN

## 2021-07-23 NOTE — DISCHARGE SUMMARY
[facility]  DISCHARGE SUMMARY     Primary Care Physician: Claudia Morris  Admission Date: 7/22/2021   Discharge Provider: Dangelo Stevenson MD Discharge Date: 7/23/2021   Diet: Orders Placed This Encounter      Combination Diet Regular Diet Adult      Diet     Code Status: Full Code   Activity: Regular        Condition at Discharge: Good     REASON FOR PRESENTATION(See Admission Note for Details)     Alcohol withdrawal with seizure    PRINCIPAL & ACTIVE DISCHARGE DIAGNOSES     Active Problems:    Alcohol withdrawal syndrome with complication (H)      SIGNIFICANT FINDINGS (Imaging, labs):     Component      Latest Ref Rng & Units 7/22/2021 7/23/2021   Sodium      136 - 145 mmol/L 137 137   Potassium      3.5 - 5.0 mmol/L 3.5 3.7   Chloride      98 - 107 mmol/L 101 105   Carbon Dioxide      22 - 31 mmol/L 21 (L) 24   Anion Gap      5 - 18 mmol/L 15 8   Urea Nitrogen      8 - 22 mg/dL 8 6 (L)   Creatinine      0.60 - 1.10 mg/dL 0.63 0.58 (L)   Calcium      8.5 - 10.5 mg/dL 9.0 8.4 (L)   Glucose      70 - 125 mg/dL 118 77   Alkaline Phosphatase      45 - 120 U/L 79 72   AST      0 - 40 U/L 97 (H) 66 (H)   ALT      0 - 45 U/L 79 (H) 59 (H)   Protein Total      6.0 - 8.0 g/dL 7.5 6.7   Albumin      3.5 - 5.0 g/dL 4.3 3.7   Bilirubin Total      0.0 - 1.0 mg/dL 1.1 (H) 1.0   GFR Estimate      >60 mL/min/1.73m2 >90 >90   Amphetamine Qual Urine      Screen Negative Screen Negative    Benzodiazepine Qual Ur      Screen Negative Screen Negative    Opiates Qualitative Urine      Screen Negative Screen Negative    Pcp Qual Urine      Screen Negative Screen Negative    Cannabinoids Qual Urine      Screen Negative Screen Negative    Barbiturates Qual Urine      Screen Negative Screen Negative    Cocaine Qual Urine      Screen Negative Screen Negative    Methadone Qual Urine      Screen Negative Screen Negative    Oxycodone Qual Urine      Screen Negative Screen Negative    Creatinine Urine      mg/dL 63    WBC      4.0 -  11.0 10e3/uL 8.3 8.1   RBC Count      3.80 - 5.20 10e6/uL 3.96 3.68 (L)   Hemoglobin      11.7 - 15.7 g/dL 12.9 12.0   Hematocrit      35.0 - 47.0 % 38.4 36.6   MCV      78 - 100 fL 97 100   MCH      26.5 - 33.0 pg 32.6 32.6   MCHC      31.5 - 36.5 g/dL 33.6 32.8   RDW      10.0 - 15.0 % 13.1 13.2   Platelet Count      150 - 450 10e3/uL 205 193   Bilirubin Direct      <=0.5 mg/dL 0.3    Alcohol, Blood      None detected mg/dL <10    Magnesium      1.8 - 2.6 mg/dL 1.5 (L) 2.4   Phosphorus      2.5 - 4.5 mg/dL 2.0 (L)    INR      0.90 - 1.15  1.03       EXAM: CT HEAD W/O CONTRAST  LOCATION: [facility]  DATE/TIME: 7/22/2021 3:15 PM     INDICATION: Seizure  COMPARISON: None.  TECHNIQUE: Routine CT Head without IV contrast. Multiplanar reformats. Dose reduction techniques were used.     FINDINGS:  INTRACRANIAL CONTENTS: No intracranial hemorrhage, extraaxial collection, or mass effect.  No CT evidence of acute infarct. Normal parenchymal attenuation. Normal ventricles and sulci.   VISUALIZED ORBITS/SINUSES/MASTOIDS: No intraorbital abnormality. No paranasal sinus mucosal disease. No middle ear or mastoid effusion.  BONES/SOFT TISSUES: No acute abnormality.                                                                IMPRESSION:  1.  No intracranial hemorrhage, mass lesions, hydrocephalus or CT evidence for an acute infarct.    EKG: NSR    PENDING LABS     None    PROCEDURES ( this hospitalization only)      None    RECOMMENDATIONS TO OUTPATIENT PROVIDER FOR F/U VISIT     -Encourage alcohol cessation  -Recommend repeat CMP    DISPOSITION     Home    SUMMARY OF HOSPITAL COURSE:      Susan Hope is a 31 year old female admitted on 7/22/2021. She has a history of alcohol use disorder and is admitted for management of alcohol withdrawal and seizure.  Patient proved with reducing alcohol withdrawal symptoms, low CIWA scores no recurrence of seizures.  Highly thought that seizure secondary to  alcohol withdrawal after patient was prior drinking 2 bottles of wine and then came into ED with blood alcohol undetectable after 42 hours since last drink.  She does not currently desire assistance with alcohol cessation as she believes she can void on her own and will follow up with PCP.     Alcohol withdrawal, improving  Seizure, suspected 2/2 alcohol withdrawal  Extensive hx of alcohol abuse presented for withdrawal symptoms and what sounds like a withdrawal seizure. Did bite her tongue but there was no incontinence. UDS was negative and patient denies any other illicit substance use.  Last drink was sometime this morning.  Has significant hx of withdrawal sx that usually includes tremors, but never had seizure or required admisison. Lab work on arrival showed mildly elevated ALT/AST and bilirubin. Patient did well after some ativan in the ED. CT of the head normal, EKG NSR. Patient has essentially returned to her baseline at this time. Not desiring rule 25. Admitted for CIWA protocol with CIWAs low and patient feeling better.  -Thiamine/folate/multivitamin  -Gabapentin taper   -Repeat CMP at follow-up  -Alcohol cessation; does not want to start any meds for this right now     Elevated transaminases  ALT 79 -> 59, AST 97 -> 66. Synthetic function normal. Appears consistent with alcohol use vs some mild ischemia related to seizure.  -Alcohol abstinence  -Repeat CMP at follow-up    Discharge Medications with Med changes:        Review of your medicines      START taking      Dose / Directions   folic acid 1 MG tablet  Commonly known as: FOLVITE      Dose: 1 mg  Start taking on: July 24, 2021  Take 1 tablet (1 mg) by mouth daily  Refills: 0     * gabapentin 300 MG capsule  Commonly known as: NEURONTIN      Dose: 900 mg  Take 3 capsules (900 mg) by mouth every 8 hours for 9 doses  Quantity: 27 capsule  Refills: 0     * gabapentin 300 MG capsule  Commonly known as: NEURONTIN      Dose: 600 mg  Start taking on: July  26, 2021  Take 2 capsules (600 mg) by mouth every 8 hours for 6 doses  Quantity: 12 capsule  Refills: 0     * gabapentin 300 MG capsule  Commonly known as: NEURONTIN      Dose: 300 mg  Start taking on: July 28, 2021  Take 1 capsule (300 mg) by mouth every 8 hours for 6 doses  Quantity: 6 capsule  Refills: 0     * gabapentin 100 MG capsule  Commonly known as: NEURONTIN      Dose: 100 mg  Start taking on: July 30, 2021  Take 1 capsule (100 mg) by mouth every 8 hours for 9 doses  Quantity: 9 capsule  Refills: 0     multivitamin w/minerals tablet      Dose: 1 tablet  Start taking on: July 24, 2021  Take 1 tablet by mouth daily  Refills: 0     thiamine 100 MG tablet  Commonly known as: B-1      Dose: 100 mg  Start taking on: July 24, 2021  Take 1 tablet (100 mg) by mouth daily  Refills: 0         * This list has 4 medication(s) that are the same as other medications prescribed for you. Read the directions carefully, and ask your doctor or other care provider to review them with you.            CONTINUE these medicines which have NOT CHANGED      Dose / Directions   sertraline 50 MG tablet  Commonly known as: ZOLOFT      Dose: 75 mg  Take 75 mg by mouth daily  Refills: 0           Where to get your medicines      These medications were sent to [facility] [address] [address]    Phone: [phone]     gabapentin 100 MG capsule    gabapentin 300 MG capsule    gabapentin 300 MG capsule    gabapentin 300 MG capsule     Some of these will need a paper prescription and others can be bought over the counter. Ask your nurse if you have questions.    You don't need a prescription for these medications    folic acid 1 MG tablet    multivitamin w/minerals tablet    thiamine 100 MG tablet             Rationale for medication changes:      -Gabapentin for withdrawal sx  -Vitamins for alcohol use        Consults   None      Immunizations  given this encounter       There is no immunization history on file for this patient.       Anticoagulation Information      Recent INR results:   Recent Labs   Lab 07/23/21  0725   INR 1.03         Discharge Orders     Discharge Procedure Orders   Reason for your hospital stay   Order Comments: Alcohol withdrawal with seizure     Follow-up and recommended labs and tests    Order Comments: Follow up with primary care provider, CEE MORRIS, within 7 days for hospital follow- up.  The following labs/tests are recommended: CMP.     Activity   Order Comments: Your activity upon discharge: no driving until Okd by your primary     Order Specific Question Answer Comments   Is discharge order? Yes      Diet   Order Comments: Follow this diet upon discharge: Orders Placed This Encounter      Combination Diet Regular Diet Adult     Order Specific Question Answer Comments   Is discharge order? Yes        Examination     Vital Signs in last 24 hours:   B/P: 116/79, T: 98.8, P: 86, R: 18    General: Awake, alert and oriented. No acute distress.    HEENT: Small scratch over right temporal region. Conjunctivae are clear, nonicteric. Right side of tongue with small laceration.  Respiratory: No respiratory distress. Lungs CTA.  Cardiac: RRR, brisk cap refill.  Abdominal: Abdomen is soft and non-tender. No hepatomegaly.  Extremities: Grossly normal.  Skin: Nonjaundiced.  Neurological: The patient is awake, alert and oriented to time. Motor function grossly normal. No tremors. No nystagmus.  Psychiatric: Appropriate judgement and insight. Straight stream of speech.      Dangelo Stevenson MD  [facility] [place] [facility]  Pager #: [phone]      Precepted patient with Dr. Cristiane Rao    CC:Cee Morris

## 2021-07-23 NOTE — CONSULTS
"CLINICAL NUTRITION SERVICES - ASSESSMENT NOTE     Nutrition Prescription    RECOMMENDATIONS FOR MDs/PROVIDERS TO ORDER:   n/a at this time    Malnutrition :  % Intake: No decreased intake noted  % Weight Loss: Weight loss does not meet criteria  Subcutaneous Fat Loss: Unable to assess  Muscle Loss: Unable to assess  Fluid Accumulation/Edema: None noted  Malnutrition Diagnosis: Patient does not meet two of the established criteria necessary for diagnosing malnutrition      Malnutrition Diagnosis: Patient does not meet two of the above criteria necessary for diagnosing malnutrition      Recommendations already ordered by Registered Dietitian (RD):  No new    Future/Additional Recommendations:  No current     REASON FOR ASSESSMENT  Susan Hope is a/an 31 year old female assessed by the dietitian for Provider Order - ETOH W/D  History of ETOH use disorder  Admitted for ETOH w/d and seizure     NUTRITION HISTORY      Food allergies/intolerances: NKFA     Cultural needs or preferences none noted     Per MD note \"drinks about 1.5 bottles of wine daily\" and recently switched from hard liquor to wine    Typical food/fluid intake PTA: pt n/a, no issues noted per risk screen    Factors affecting nutrition intake include: ETOH withdrawal,     CURRENT NUTRITION ORDERS  Diet: Regular    Intake/Tolerance:     Per flow sheet review, 100% intake for documented  For 1 meal   Factors affecting nutrition intake include:ETOH withdrawal    LABS: reviewed including:  Potassium (mmol/L)   Date Value   07/23/2021 3.7   07/22/2021 3.5     Phosphorus (mg/dL)   Date Value   07/22/2021 2.0 (L)    Blood Glucose  Glucose (mg/dL)   Date Value   07/23/2021 77   07/22/2021 118    Inflammatory Markers  WBC Count (10e3/uL)   Date Value   07/23/2021 8.1   07/22/2021 8.3     Albumin (g/dL)   Date Value   07/23/2021 3.7   07/22/2021 4.3     Magnesium (mg/dL)   Date Value   07/23/2021 2.4   07/22/2021 1.5 (L)     Sodium (mmol/L)   Date Value " "  07/23/2021 137   07/22/2021 137    Renal  Urea Nitrogen (mg/dL)   Date Value   07/23/2021 6 (L)   07/22/2021 8     Creatinine (mg/dL)   Date Value   07/23/2021 0.58 (L)   07/22/2021 0.63     Additional  Ketones Urine (mg/dL)   Date Value   07/22/2021 100  (A)       MEDICATIONS    Medications reviewed    cloNIDine  0.1 mg Oral Q8H     folic acid  1 mg Oral Daily     [START ON 7/30/2021] gabapentin  100 mg Oral Q8H     [START ON 7/28/2021] gabapentin  300 mg Oral Q8H     [START ON 7/26/2021] gabapentin  600 mg Oral Q8H     gabapentin  900 mg Oral Q8H     multivitamin w/minerals  1 tablet Oral Daily     sertraline  75 mg Oral Daily     sodium chloride (PF)  3 mL Intracatheter Q8H     thiamine  100 mg Oral Daily                 ANTHROPOMETRICS  Height: 5' 6\"  Weight: 72 kg   Body mass index is 25.78 kg/m .  Weight Status:  Overweight BMI 25-29.9    Weight History:   Wt Readings from Last 10 Encounters:   07/22/21 72.4 kg (159 lb 11.2 oz)     07/22/21 1749 72.4 kg (159 lb 11.2 oz) -- [facility]   07/22/21 1411 65.8 kg (145 lb) -- [facility]     5/12/2021     152 # office visit    5 % weight loss in 2 months, as noted above not clinically significant and pt was dehydrated on admission per MD note    122 % IBW    Dosing Weight: 62 kg   adjusted     ASSESSED NUTRITION NEEDS  Estimated Energy Needs: 6032-0819 kcals/day (25 - 30 kcals/kg)  Justification: Maintenance  Estimated Protein Needs: 62-72 grams protein/day (1 - 1.2 grams of pro/kg)  Justification: Maintenance  Estimated Fluid Needs:  (1 mL/kcal)   Justification: Per provider pending fluid status    PHYSICAL FINDINGS  See malnutrition section above.     Alfonzo score :Nutrition 3 Total Score 20    GI Status/Output:  LBM:not noted WDL  per nursing   No issues noted    NUTRITION DIAGNOSIS  No nutrition diagnosis at this time     INTERVENTIONS  Implementation     Continue current nutrition Rx     Goals    Achieve/maintain fluid and electrolyte balance  Intake > 75% of " meals    Monitoring/Evaluation  Progress toward goals will be monitored and evaluated per protocol.

## 2021-07-23 NOTE — PROGRESS NOTES
Care Management Follow Up    Length of Stay (days): 1    Expected Discharge Date: 07/23/2021     Concerns to be Addressed:       Patient plan of care discussed at interdisciplinary rounds: Yes    Anticipated Discharge Disposition:       Anticipated Discharge Services:    Anticipated Discharge DME:      Patient/family educated on Medicare website which has current facility and service quality ratings:    Education Provided on the Discharge Plan:    Patient/Family in Agreement with the Plan:      Referrals Placed by CM/SW:    Private pay costs discussed: Not applicable    Additional Information:  Met with pt and significant other in pt's room.  Pt states she drinks 1 bottle to 1 1/2 bottles of wine every other day.  Pt has never been to treatment for her alcohol use.  Pt feels that this was the wake up call and is able to void alcohol on her own.  Offered CD resources/referrals.  Pt accepting of resources if needed in future.  Does not want any referrals made at this time.      KATHIE Rivera

## 2021-07-23 NOTE — PLAN OF CARE
Problem: Alcohol Withdrawal  Goal: Alcohol Withdrawal Symptom Control  Outcome: Improving     Problem: Anxiety  Goal: Anxiety Reduction or Resolution  Outcome: Improving      Pt scored 3, 4 on CIWA scale for slight tremor, diaphoresis, and anxiety.  Received scheduled gabapentin but no prns needed.  Pt reports some tongue discomfort/swelling so ice chips were provided.  Seizure precautions in place.

## 2021-07-23 NOTE — UTILIZATION REVIEW
Inpatient appropriate  Admission Status; Secondary Review Determination     Under the authority of the Utilization Management Committee, the utilization review process indicated a secondary review on the above patient. The review outcome is based on review of the medical records, discussions with staff, and applying clinical experience noted on the date of the review.     (x) Inpatient Status Appropriate - This patient's medical care is consistent with medical management for inpatient care and reasonable inpatient medical practice.     RATIONALE FOR DETERMINATION   31 years old female evaluated in the ED on July 22, 2021 for a witnessed seizure.  Past history remarkable for alcohol use disorder.  Laboratory work-up remarkable for magnesium 1.5, phosphorus 2, ALT 79, AST 97.  CT head showed no intracranial hemorrhage, mass lesions, hydrocephalus or CT evidence for acute infarct.  Patient was admitted for evaluation and treatment of alcohol withdrawal seizures.  At the time of admission with the information available to the attending physician more than 2 nights Hospital complex care was anticipated, based on patient risk of adverse outcome if treated as outpatient and complex care required. Inpatient admission is appropriate based on the Medicare guidelines.     This document was produced using voice recognition software     The information on this document is developed by the utilization review team in order for the business office to ensure compliance. This only denotes the appropriateness of proper admission status and does not reflect the quality of care rendered.   The definitions of Inpatient Status and Observation Status used in making the determination above are those provided in the CMS Coverage Manual, Chapter 1 and Chapter 6, section 70.4.     Sincerely,     Koko Ferrara MD  [facility]  Utilization Review Physician Advisor  Pager: [phone]

## 2021-07-23 NOTE — PLAN OF CARE
CONSTANZAWA scoring 3 for anxiety, sweatiness, and slight tremor.  Denies pain except for tongue soreness, believes she bit it while having seizure.    Problem: Alcohol Withdrawal  Goal: Alcohol Withdrawal Symptom Control  Outcome: Improving     Problem: Anxiety  Goal: Anxiety Reduction or Resolution  Outcome: Improving

## 2021-07-23 NOTE — PLAN OF CARE
Problem: Adult Inpatient Plan of Care  Goal: Plan of Care Review  Outcome: Adequate for Discharge  Goal: Patient-Specific Goal (Individualized)  Outcome: Adequate for Discharge  Goal: Absence of Hospital-Acquired Illness or Injury  Outcome: Adequate for Discharge  Intervention: Identify and Manage Fall Risk  Recent Flowsheet Documentation  Taken 7/23/2021 0800 by Jacky Tejeda RN  Safety Promotion/Fall Prevention:    activity supervised    safety round/check completed    patient and family education  Intervention: Prevent Skin Injury  Recent Flowsheet Documentation  Taken 7/23/2021 0800 by Jacky Tejeda RN  Body Position: position changed independently  Goal: Optimal Comfort and Wellbeing  Outcome: Adequate for Discharge  Goal: Readiness for Transition of Care  Outcome: Adequate for Discharge     Problem: Alcohol Withdrawal  Goal: Alcohol Withdrawal Symptom Control  Outcome: Adequate for Discharge     Problem: Acute Neurologic Deterioration (Alcohol Withdrawal)  Goal: Optimal Neurologic Function  Outcome: Adequate for Discharge     Problem: Substance Misuse (Alcohol Withdrawal)  Goal: Readiness for Change Identified  Outcome: Adequate for Discharge     Problem: Anxiety  Goal: Anxiety Reduction or Resolution  Outcome: Adequate for Discharge   Pt is alert and oriented x4. Pt's v/s stable. Pt received all of her discharge paperwork and belongings.pt left the unit at 1300.

## 2021-07-23 NOTE — CONSULTS
Care Management Initial Consult    General Information  Assessment completed with: Patient, pt  Type of CM/SW Visit: Initial Assessment    Primary Care Provider verified and updated as needed: Yes   Readmission within the last 30 days: no previous admission in last 30 days   Return Category: New Diagnosis  Reason for Consult: discharge planning  Advance Care Planning: Advance Care Planning Reviewed: no concerns identified, questions answered  provide HCD form       Communication Assessment  Patient's communication style: spoken language (English or Bilingual)    Hearing Difficulty or Deaf: no   Wear Glasses or Blind: no    Cognitive  Cognitive/Neuro/Behavioral: WDL                      Living Environment:   People in home: significant other     Current living Arrangements:        Able to return to prior arrangements:         Family/Social Support:  Care provided by:    Provides care for:                  Description of Support System:           Current Resources:   Patient receiving home care services: No     Community Resources: None  Equipment currently used at home: none  Supplies currently used at home: None    Employment/Financial:  Employment Status:          Financial Concerns:             Lifestyle & Psychosocial Needs:  Social Determinants of Health     Tobacco Use:      Smoking Tobacco Use:      Smokeless Tobacco Use:    Alcohol Use:      Frequency of Alcohol Consumption:      Average Number of Drinks:      Frequency of Binge Drinking:    Financial Resource Strain:      Difficulty of Paying Living Expenses:    Food Insecurity:      Worried About Running Out of Food in the Last Year:      Ran Out of Food in the Last Year:    Transportation Needs:      Lack of Transportation (Medical):      Lack of Transportation (Non-Medical):    Physical Activity:      Days of Exercise per Week:      Minutes of Exercise per Session:    Stress:      Feeling of Stress :    Social Connections:      Frequency of Communication  with Friends and Family:      Frequency of Social Gatherings with Friends and Family:      Attends [employer]ology Services:      Active Member of Clubs or Organizations:      Attends Club or Organization Meetings:      Marital Status:    Intimate Partner Violence:      Fear of Current or Ex-Partner:      Emotionally Abused:      Physically Abused:      Sexually Abused:    Depression:      PHQ-2 Score:    Housing Stability:      Unable to Pay for Housing in the Last Year:      Number of Places Lived in the Last Year:      Unstable Housing in the Last Year:        Functional Status:  Prior to admission patient needed assistance:   Dependent ADLs:: Independent  Dependent IADLs:: Independent  Assesssment of Functional Status: At functional baseline    Mental Health Status:  Mental Health Status: No Current Concerns       Chemical Dependency Status:                Values/Beliefs:  Spiritual, Cultural Beliefs, Scientology Practices, Values that affect care:                 Additional Information:  Assessed, lives w/partner, no svcs and independent, given HCD materials, and partner, Rodo will transport at discharge.       Kole Mcguire RN